# Patient Record
(demographics unavailable — no encounter records)

---

## 2024-11-25 NOTE — HISTORY OF PRESENT ILLNESS
[FreeTextEntry1] : med renewal, labs, flu shot [de-identified] : Pt comes in for med renewal of crestor and repeat labs and flu shot. Saw Dr. Nikole shearer in June had EGD/colonoscopy had hyperplastic polyp and GERD on Protonix now no complaints at this time.

## 2024-11-25 NOTE — ASSESSMENT
[FreeTextEntry1] : renewed crestor 10mg qhs c/w diet and exercise  labs today flu shot given  f/u 6 months

## 2024-12-10 NOTE — PHYSICAL EXAM
[de-identified] : He is fully alert and oriented with a normal mood and affect.  He is in no acute distress as I take the history.  He ambulates with a normal gait including tiptoe and heel walking.  There are no antalgic or coxalgia components to his gait.  There are no cutaneous abnormalities or palpable bony defects of the spine.  There is no evidence of shortness of breath or respiratory distress.  There is no paravertebral muscle spasm, sciatic notch tenderness or trochanteric tenderness.  Forward flexion of the spine is limited by tight hamstrings with the Fogt fingers reaching to within 12 or 14 inches of the floor.  With forward flexion of the spine he has a small right-sided thoracolumbar prominence.  His lower extremity neurological examination revealed 1-2+ symmetrical reflexes.  Motor power is normal to manual testing all lower extremity groups and sensation is normal to light touch in all dermatomes.  Straight leg raising is negative to 90 degrees in the sitting position bilaterally.  His hips and his knees have a full and painless range of motion with normal stability.  Vascular examination shows no evidence of varicosities and there is no lymphedema. [de-identified] : I reviewed the prior CT scans of the cervical and thoracic spine.  There were normal with the exception of some mild wedging of T12 and L1.  AP and lateral x-rays of the lumbar spine were performed in the office today.  Again the mild wedging of T12 and L1 can be seen suggestive of the possibility of Scheuermann's disease as an adolescent.  There is mild disc space narrowing at the L4-5 level.  Sagittal alignment is normal and there are no destructive changes.

## 2024-12-10 NOTE — DISCUSSION/SUMMARY
[Medication Risks Reviewed] : Medication risks reviewed [de-identified] : He will rest and use moist heat.  As the pain is only in the 3 or 4 range and because of his prior reflux symptoms that are currently well-controlled with the Protonix he has been started on ibuprofen but only 600 mg 3 times a day.  He will call in 2 weeks if he has not improved at all and we will increase the dose to 800 mg 3 times a day.  I will see him for follow-up in 4 weeks.

## 2024-12-10 NOTE — HISTORY OF PRESENT ILLNESS
[de-identified] : This 36-year-old [place]  earlier this year had some mid and upper back pain with CT scans of the cervical and thoracic spine.  The symptoms eventually resolved.  Since he was on vacation in early to mid October he had the onset of intermittent lower back pain that can be as bad as a 3 or 4.  He has not had leg pain.  He has not had lower extremity neurologic symptoms of numbness, paresthesias or weakness.  The pain is no worse coughing or forcing to move his bowels but it has been worse sneezing.  He has not had night pain.  The pain is occasionally worse sitting and driving but no worse standing and walking.  Treatment to date has been heat and occasional Tylenol.  He has been on Protonix for reflux symptoms since very early this summer with complete control the symptoms.  He is also on a statin.  He has had prior left knee surgery as a child and right shoulder surgery. [Pain Location] : pain [Worsening] : worsening [3] : a minimum pain level of 3/10 [4] : a maximum pain level of 4/10 [Sitting] : sitting

## 2025-01-07 NOTE — PHYSICAL EXAM
[de-identified] : He is comfortable sitting today and straight leg raising is negative to 90 degrees bilaterally.

## 2025-01-07 NOTE — DISCUSSION/SUMMARY
[Medication Risks Reviewed] : Medication risks reviewed [de-identified] : He has been given a prescription for physical therapy for instruction in flexion and extension lower back exercises with hopes of decreasing the future frequency and severity of these episodes.  I will see him for follow-up on a as needed basis.

## 2025-01-07 NOTE — HISTORY OF PRESENT ILLNESS
[de-identified] : He returns for follow-up of his lower back symptoms.  He has been on ibuprofen 800 mg 3 times a day.  The back pain that was a constant 3 or 4 is fully resolved and he has been off the medication for a week as he has been taking other medications for an upper respiratory issue. [Pain Location] : pain [Improving] : improving [0] : a maximum pain level of 0/10